# Patient Record
Sex: FEMALE | Race: WHITE
[De-identification: names, ages, dates, MRNs, and addresses within clinical notes are randomized per-mention and may not be internally consistent; named-entity substitution may affect disease eponyms.]

---

## 2018-07-12 ENCOUNTER — HOSPITAL ENCOUNTER (INPATIENT)
Dept: HOSPITAL 56 - MW.CHOBGYN | Age: 28
LOS: 3 days | Discharge: HOME | DRG: 540 | End: 2018-07-15
Attending: OBSTETRICS & GYNECOLOGY | Admitting: OBSTETRICS & GYNECOLOGY
Payer: COMMERCIAL

## 2018-07-12 DIAGNOSIS — Z30.2: ICD-10-CM

## 2018-07-12 DIAGNOSIS — O34.211: Primary | ICD-10-CM

## 2018-07-12 DIAGNOSIS — Z3A.38: ICD-10-CM

## 2018-07-12 DIAGNOSIS — Z91.040: ICD-10-CM

## 2018-07-13 PROCEDURE — 0UT70ZZ RESECTION OF BILATERAL FALLOPIAN TUBES, OPEN APPROACH: ICD-10-PCS | Performed by: OBSTETRICS & GYNECOLOGY

## 2018-07-13 RX ADMIN — KETOROLAC TROMETHAMINE SCH MG: 30 INJECTION, SOLUTION INTRAMUSCULAR at 23:10

## 2018-07-13 RX ADMIN — KETOROLAC TROMETHAMINE SCH MG: 30 INJECTION, SOLUTION INTRAMUSCULAR at 17:31

## 2018-07-13 RX ADMIN — KETOROLAC TROMETHAMINE SCH MG: 30 INJECTION, SOLUTION INTRAMUSCULAR at 11:18

## 2018-07-13 NOTE — PCM.SN
- Free Text/Narrative


Note: 





Asked to start patient IV.  IV started in right hand with 20g angiocath x 1 

attempt with good blood flow and flushes easily.  Secured with tegaderm.

## 2018-07-13 NOTE — OR
SURGEON:

Escobar Rayo MD

 

DATE OF PROCEDURE:

 

PREOPERATIVE DIAGNOSES:

1. Intrauterine pregnancy, term.

2. Multiple previous  sections, admitted for elective repeat 

    section.

3. Postpartum tubal ligation.

 

POSTOPERATIVE DIAGNOSES:

1. Intrauterine pregnancy, term.

2. Multiple previous  sections, admitted for elective repeat 

    section.

3. Postpartum tubal ligation.

 

OPERATION PERFORMED:

Repeat low-transverse  section with Tricia tubal ligation achieving by

doing distal salpingectomy.

 

ASSISTANT:

OR tech.

 

ANESTHESIA:

Spinal, eK Faulkner and Mikel Christina M.D.

 

ESTIMATED BLOOD LOSS:

800 mL.

 

COMPLICATIONS:

None.

 

INDICATION FOR THE SURGERY:

This patient is 28, she had 3 previous  sections.  She has a higher

risk.  She has a high BMI and she is admitted for elective repeat 

section.  She was approved by the ethic committee for postpartum tubal ligation.

 

PROCEDURE IN DETAIL:

The patient was brought to the OR, properly identified.  After adequate level of

spinal anesthesia, the patient was prepped and draped in sterile fashion as

usual.  Low-transverse Pfannenstiel skin incision was done.  Diana's fascia and

rectus fascia opened in direction of the incision.  The 2 recti muscles were

 and peritoneal cavity was entered.  The fetus was in the vertex

position.  Low-transverse uterine incision was done and extended manually with

the hand.  Fetus was delivered without any problem, cried immediately.  Apgar

score reported to be 8 and 9.  The weight is not available.  The placenta

delivered spontaneous, complete, and intact.  Repair of the lower uterine

segment was done in 2 layers using 2-0 Vicryl interlocking suture.  The

peritoneal cavity evacuated completely from all blood and blood clot.  Attention

was paid to the tube at this time and distal partial salpingectomy on both sides

was done to achieve bilateral tubal ligation.  Once that was done, inspection of

the operative field shows no oozing, no bleeding.  The peritoneum closed with 3-

0 Vicryl continuous and the rectus fascia was closed with #1 PDS double strand.

After that, the Avni-Kyle was placed in the subcutaneous fascia for drainage

because of the size of the patient and then 3-0 Vicryl used to close the

Diana's fascia and skin closed with skin clips.  Instrument and sponge count

was correct.  The patient tolerated the procedure well, went to recovery room in

stable general condition.

 

 

KATIE / BEATRICE

DD:  2018 10:17:26

DT:  2018 12:02:44

Job #:  [id number]

## 2018-07-13 NOTE — PCM.LDHP
L&D History of Present Illness





- General


Date of Service: 18


Admit Problem/Dx: 


 Patient Status Order with Admit Dx/Problem





18 01:35


Patient Status [ADT] Routine 





18 08:54


Patient Status [ADT] Routine 








 Admission Diagnosis/Problem











Admission Diagnosis/Problem    Pregnancy-related examination














Source of Information: Patient


History Limitations: Reports: No Limitations





- History of Present Illness


Improves with: Reports: None


Worsens with: Reports: None


Associated Symptoms: Reports: N





- Related Data


Allergies/Adverse Reactions: 


 Allergies











Allergy/AdvReac Type Severity Reaction Status Date / Time


 


latex Allergy  Rash Verified 07/10/18 10:36











Home Medications: 


 Home Meds





PNV95/Ferrous Fumarate/FA [Prenatal Tablet] 1 tab PO DAILY 07/10/18 [History]











Past Medical History





- Past Health History


Medical/Surgical History: Denies Medical/Surgical History


HEENT History: Reports: Other (See Below)


Other HEENT History: wears glasses lazy eye


OB/GYN History: Reports: Pregnancy


Other OB/BYN History:  Birth x2


Hematologic History: Reports: Anemia


Other Hematologic History: with first pregnancy.  Has taken iron with the last 

2 pregnancies.


Other Dermatologic History: right lower leg has sore that has clear fluid 

leaking and started x1.5 weeks ago. Sores notes on left lower leg





- Infectious Disease History


Infectious Disease History: Reports: Chicken Pox





- Past Surgical History


Head Surgeries/Procedures: Reports: Other (See Below)


HEENT Surgical History: Reports: None





Social & Family History





- Family History


OBGYN: Reports: Other (See Below)


Other OBGYN Family History: Mother and PGM Cancer





H&P Review of Systems





- Review of Systems:


Review Of Systems: See Below


General: Reports: No Symptoms


HEENT: Reports: No Symptoms


Pulmonary: Reports: No Symptoms


Cardiovascular: Reports: No Symptoms


Gastrointestinal: Reports: No Symptoms


Genitourinary: Reports: No Symptoms


Musculoskeletal: Reports: No Symptoms


Skin: Reports: No Symptoms


Psychiatric: Reports: No Symptoms


Neurological: Reports: No Symptoms


Hematologic/Lymphatic: Reports: No Symptoms


Immunologic: Reports: No Symptoms





L&D Exam





- Exam


Exam: See Below





- Vital Signs


Weight: 190.509 kg





- Exam


General: Alert, Oriented


HEENT: PERRLA, Conjunctiva Clear, EACs Clear, EOMI, Hearing Intact, Mucosa 

Moist & Pink, Nares Patent, Normal Nasal Septum, Posterior Pharynx Clear, TMs 

Clear


Neck: Supple, Trachea Midline


Lungs: Clear to Auscultation, Normal Respiratory Effort


Cardiovascular: Regular Rate, Regular Rhythm


GI/Abdominal Exam: Normal Bowel Sounds, Soft, Non-Tender, No Organomegaly, No 

Distention, No Abnormal Bruit, No Mass, Pelvis Stable


Rectal Exam: Normal Exam, Normal Rectal Tone


Genitourinary: Normal external exam, Normal bimanual exam, Normal speculum exam


Back Exam: Normal Inspection, Full Range of Motion


Extremities: Normal Inspection, Normal Range of Motion, Non-Tender, No Pedal 

Edema, Normal Capillary Refill


Skin: Warm, Dry, Intact


Neurological: Cranial Nerves Intact, Reflexes Equal Bilateral


Psychiatric: Alert, Normal Affect, Normal Mood





- Patient Data


Lab Results Last 24 hrs: 


 Laboratory Results - last 24 hr











  18 Range/Units





  00:13 01:50 01:50 


 


WBC   12.14 H   (4.0-11.0)  K/uL


 


RBC   3.42 L   (4.30-5.90)  M/uL


 


Hgb   9.7 L   (12.0-16.0)  g/dL


 


Hct   30.2 L   (36.0-46.0)  %


 


MCV   88.3   (80.0-98.0)  fL


 


MCH   28.4   (27.0-32.0)  pg


 


MCHC   32.1   (31.0-37.0)  g/dL


 


RDW Std Deviation   41.3   (28.0-62.0)  fl


 


RDW Coeff of Orquidea   14   (11.0-15.0)  %


 


Plt Count   242   (150-400)  K/uL


 


MPV   11.30   (7.40-12.00)  fL


 


Fetal Membrane Rupture  POSITIVE    


 


Blood Type    A POSITIVE  


 


Antibody Screen    NEGATIVE  











Result Diagrams: 


 18 01:50





Problem List Initiated/Reviewed/Updated: Yes


Orders Last 24hrs: 


 Active Orders 24 hr











 Category Date Time Status


 


 Patient Status [ADT] Routine ADT  18 08:54 Ordered


 


 Ambulate [RC] PER UNIT ROUTINE Care  18 08:54 Ordered


 


 Bradycardia-Neuroaxis [employer] [RC] ROUTINE Care  18 08:34 Active


 


 Communication Order [RC] PER UNIT ROUTINE Care  18 08:54 Ordered


 


 Communication Order [RC] PER UNIT ROUTINE Care  18 08:54 Ordered


 


 Communication Order [RC] Per Unit Routine Care  18 08:54 Ordered


 


 Fetal Non Stress Test [RC] PER UNIT ROUTINE Care  18 00:10 Active


 


 Fetal Non Stress Test [RC] PER UNIT ROUTINE Care  18 01:35 Active


 


 Hypertension-Neuroaxis [employer] [RC] ROUTINE Care  18 08:34 Active


 


 Hypotension-Neuroaxis [employer] [RC] ROUTINE Care  18 08:34 Active


 


 May Shower [RC] ASDIRECTED Care  18 08:54 Ordered


 


 Notify Provider Vital Signs [RC] PRN Care  18 01:37 Active


 


 Oxygen Therapy [RC] PER UNIT ROUTINE Care  18 08:34 Active


 


 Procedure Site Prep Instruct [RC] ASDIRECTED Care  18 01:35 Active


 


 RT Incentive Spirometry [RC] Q2HWA Care  18 08:54 Ordered


 


 Up ad Marley [RC] ASDIRECTED Care  18 00:10 Active


 


 Up ad Marley [RC] ASDIRECTED Care  18 01:35 Active


 


 Vaginal Exam [RC] Click to Edit Care  18 00:10 Active


 


 Verify Patient Consent Obtain [RC] ASDIRECTED Care  18 01:35 Active


 


 Vital Signs [RC] PER UNIT ROUTINE Care  18 00:10 Active


 


 Vital Signs [RC] PER UNIT ROUTINE Care  18 01:35 Active


 


 Vital Signs [RC] PER UNIT ROUTINE Care  18 08:54 Ordered


 


 Vital Signs [RC] Q1H Care  18 08:34 Active


 


 HEMOGLOBIN/HEMATOCRIT,HH [HEME] Timed Lab  18 05:11 Ordered


 


 Acetaminophen/oxyCODONE [Percocet 325-5 MG] Med  18 08:54 Ordered





 1 tab PO Q4H PRN   


 


 Acetaminophen/oxyCODONE [Percocet 325-5 MG] Med  18 08:54 Ordered





 2 tab PO Q4H PRN   


 


 Bisacodyl [Dulcolax] Med  18 08:54 Ordered





 10 mg RECTAL .ONCE PRN   


 


 Citric Acid/Sodium Citrate [Bicitra Solution] Med  18 01:45 Active





 30 ml PO .ONCE   


 


 Docusate Sodium [Colace] Med  18 09:00 Ordered





 100 mg PO BID   


 


 Ibuprofen [Motrin] Med  18 08:54 Ordered





 800 mg PO Q8H PRN   


 


 Ketorolac [Toradol] Med  18 09:00 Ordered





 30 mg IVPUSH Q6H   


 


 Lactated Ringers @ 125 MLS/HR(1000ml) Med  18 09:00 Ordered





 Lactated Ringers [Ringers, Lactated] 1,000 ml   





 IV ASDIRECTED   


 


 Lactated Ringers [Ringers, Lactated] 1,000 ml Med  18 01:45 Active





 IV .BOLUS   


 


 Lanolin [Lansinoh HPA] Med  18 08:54 Ordered





 See Dose Instructions  TOP ASDIRECTED PRN   


 


 Nalbuphine [Nubain] Med  18 08:34 Active





 10 mg IVPUSH Q3H PRN   


 


 Naloxone [Narcan] Med  18 08:34 Active





 0.1 mg IVPUSH ONETIME PRN   


 


 Ondansetron [Zofran] Med  18 08:54 Ordered





 4 mg IV Q4H PRN   


 


 Oxytocin/0.9 % Sodium Chloride [Oxytocin 30 Unit/500 ML Med  18 01:45 

Active





 -NS]   





 30 unit in 500 ml IV TITRATE   


 


 Sodium Chloride 0.9% [Saline Flush] Med  18 01:35 Active





 10 ml FLUSH ASDIRECTED PRN   


 


 Sodium Chloride 0.9% [Saline Flush] Med  18 01:35 Active





 2.5 ml FLUSH ASDIRECTED PRN   


 


 diphenhydrAMINE [Benadryl] Med  18 08:54 Ordered





 25 mg IVPUSH Q6H PRN   


 


 AN Neuroaxis [employer] Precaution Reflex [OM.PC] PER Ot  18 08:45 

Ordered





 UNIT ROUTINE   


 


 AN Neuroaxis [employer] Precaution Reflex [OM.PC] PER Ot  18 08:45 

Ordered





 UNIT ROUTINE   


 


 Assess Lochia [WOMSER] Per Unit Routine Ot  18 08:54 Ordered


 


 Assess Uterine Involution [WOMSER] Per Unit Routine Ot  18 08:54 Ordered


 


 Breast Pump [WOMSER] Per Unit Routine Ot  18 08:54 Ordered


 


 Peripheral IV Discontinue [OM.PC] Routine Ot  18 08:54 Ordered


 


 Peripheral IV Insertion Adult [OM.PC] Routine Ot  18 01:35 Ordered


 


 Schedule Procedure [COMM] Per Unit Routine Ot  18 01:35 Ordered


 


 Sequential Compression Device [OM.PC] Per Unit Routine Ot  18 08:54 

Ordered


 


 Resuscitation Status Routine Resus Stat  18 00:10 Ordered








 Medication Orders





Citric Acid/Sodium Citrate (Bicitra Solution)  30 ml PO .ONCE ALYSON


   Last Admin: 18 07:38  Dose: 30 ml


Oxytocin/Sodium Chloride (Oxytocin 30 Unit/500 Ml-Ns)  30 unit in 500 mls @ 250 

mls/hr IV TITRATE ALYSON


Lactated Ringer's (Ringers, Lactated)  1,000 mls @ 500 mls/hr IV .BOLUS ALYSON


   Last Admin: 18 07:26  Dose: 500 mls/hr


   Infusion: 18 03:45  Dose: 500 mls/hr


   Admin: 18 01:45  Dose: 500 mls/hr


Nalbuphine HCl (Nubain)  10 mg IVPUSH Q3H PRN


   PRN Reason: Pruritis


   Stop: 18 08:35


Naloxone HCl (Narcan)  0.1 mg IVPUSH ONETIME PRN


   PRN Reason: Respiratory Depression


   Stop: 18 08:34


Sodium Chloride (Saline Flush)  10 ml FLUSH ASDIRECTED PRN


   PRN Reason: Keep Vein Open


Sodium Chloride (Saline Flush)  2.5 ml FLUSH ASDIRECTED PRN


   PRN Reason: Keep Vein Open








Assessment/Plan Comment:: 





Admitted for repeat C/section and tubal ligation.

## 2018-07-13 NOTE — PCM.PREANE
Preanesthetic Assessment





- Procedure


Proposed Procedure: 





; prior 2 yr ago; BTL





- Anesthesia/Transfusion/Family Hx


Anesthesia History: Prior Anesthesia Without Reaction


Family History of Anesthesia Reaction: No


Additional History: 





BMI 74








- Review of Systems


General: Other (active labor)


Pulmonary: No Symptoms


Cardiovascular: No Symptoms


Gastrointestinal: Other (GERD)


Neurological: Difficulty Walking (due to weight)


Other: Reports: None





- Physical Assessment


NPO Status Date: 18


NPO Status Time: 22:30


Height: 5 ft 3 in


Weight: 420 lb


ASA Class: 3E


Mental Status: Alert & Oriented x3


Airway Class: Mallampati = 2


Dentition: Reports: Normal Dentition


Thyro-Mental Finger Breadths: 3 (short ,full neck)


Mouth Opening Finger Breadths: 3


ROM/Head Extension: Limited/Partial


Lungs: Clear to Auscultation, Normal Respiratory Effort


Cardiovascular: Regular Rate, Regular Rhythm, No Murmurs





- Lab


Values: 





 Laboratory Last Values











WBC  12.14 K/uL (4.0-11.0)  H  18  01:50    


 


RBC  3.42 M/uL (4.30-5.90)  L  18  01:50    


 


Hgb  9.7 g/dL (12.0-16.0)  L  18  01:50    


 


Hct  30.2 % (36.0-46.0)  L  18  01:50    


 


MCV  88.3 fL (80.0-98.0)   18  01:50    


 


MCH  28.4 pg (27.0-32.0)   18  01:50    


 


MCHC  32.1 g/dL (31.0-37.0)   18  01:50    


 


RDW Std Deviation  41.3 fl (28.0-62.0)   18  01:50    


 


RDW Coeff of Orquidea  14 % (11.0-15.0)   18  01:50    


 


Plt Count  242 K/uL (150-400)   18  01:50    


 


MPV  11.30 fL (7.40-12.00)   18  01:50    


 


Fetal Membrane Rupture  POSITIVE   18  00:13    


 


Blood Type  A POSITIVE   18  01:50    


 


Antibody Screen  NEGATIVE   18  01:50    














- Allergies


Allergies/Adverse Reactions: 


 Allergies











Allergy/AdvReac Type Severity Reaction Status Date / Time


 


latex Allergy  Rash Verified 07/10/18 10:36














- Blood


Blood Available: No


Product(s) Available: PRBC (T and S)





- Anesthesia Plan


Pre-Op Medication Ordered: Antacids





- Acknowledgements


Anesthesia Type Planned: Spinal (sitting; if unsuccessful, then general 

anesthetic (she understands))


Pt an Appropriate Candidate for the Planned Anesthesia: Yes


Alternatives and Risks of Anesthesia Discussed w Pt/Guardian: Yes


Pt/Guardian Understands and Agrees with Anesthesia Plan: Yes





PreAnesthesia Questionnaire





- Past Health History


Medical/Surgical History: Denies Medical/Surgical History


HEENT History: Reports: Other (See Below)


Other HEENT History: wears glasses lazy eye


OB/GYN History: Reports: Pregnancy


Other OB/BYN History:  Birth x2


Hematologic History: Reports: Anemia


Other Hematologic History: with first pregnancy.  Has taken iron with the last 

2 pregnancies.


Other Dermatologic History: right lower leg has sore that has clear fluid 

leaking and started x1.5 weeks ago. Sores notes on left lower leg





- Infectious Disease History


Infectious Disease History: Reports: Chicken Pox





- Past Surgical History


Head Surgeries/Procedures: Reports: Other (See Below)


HEENT Surgical History: Reports: None





- HOME MEDS


Home Medications: 


 Home Meds





PNV95/Ferrous Fumarate/FA [Prenatal Tablet] 1 tab PO DAILY 07/10/18 [History]











- CURRENT (IN HOUSE) MEDS


Current Meds: 





 Current Medications





Citric Acid/Sodium Citrate (Bicitra Solution)  30 ml PO .ONCE ALYSON


Oxytocin/Sodium Chloride (Oxytocin 30 Unit/500 Ml-Ns)  30 unit in 500 mls @ 250 

mls/hr IV TITRATE ALYSON


Lactated Ringer's (Ringers, Lactated)  1,000 mls @ 500 mls/hr IV .BOLUS ALYSON


   Last Admin: 18 01:45 Dose:  500 mls/hr


Sodium Chloride (Saline Flush)  10 ml FLUSH ASDIRECTED PRN


   PRN Reason: Keep Vein Open


Sodium Chloride (Saline Flush)  2.5 ml FLUSH ASDIRECTED PRN


   PRN Reason: Keep Vein Open





Discontinued Medications





Fentanyl (Sublimaze) Confirm Administered Dose 250 mcg .ROUTE .STK-MED ONE


   Stop: 18 07:12


Fentanyl (Sublimaze) Confirm Administered Dose 100 mcg .ROUTE .STK-MED ONE


   Stop: 18 07:13


Lidocaine HCl (Xylocaine-Mpf 1%) Confirm Administered Dose 5 mls @ as directed 

.ROUTE .ST-MED ONE


   Stop: 18 01:52


Lidocaine (Xylocaine-Mpf 2%) Confirm Administered Dose 10 ml .ROUTE .STK-MED ONE


   Stop: 18 07:12


Midazolam HCl (Versed 1 Mg/Ml) Confirm Administered Dose 2 mg .ROUTE .STK-MED 

ONE


   Stop: 18 07:12


Ondansetron HCl (Zofran) Confirm Administered Dose 8 mg .ROUTE .STK-MED ONE


   Stop: 18 07:13


Oxytocin (Pitocin) Confirm Administered Dose 20 unit .ROUTE .STK-MED ONE


   Stop: 18 07:13


Propofol (Diprivan  20 Ml) Confirm Administered Dose 400 mg .ROUTE .STK-MED ONE


   Stop: 18 07:12


Succinylcholine Chloride (Quelicin) Confirm Administered Dose 200 mg .ROUTE .STK

-MED ONE


   Stop: 18 07:13

## 2018-07-13 NOTE — PCM.OPNOTE
- General Post-Op/Procedure Note


Date of Surgery/Procedure: 07/13/18


Operative Procedure(s): Repeat C/section. Tubal ligation.


Post-Op Diagnosis: Same


Anesthesia Technique: Spinal


Primary Surgeon: Escobar Rayo


EBL in mLs: 800


Complications: None


Condition: Good

## 2018-07-14 RX ADMIN — KETOROLAC TROMETHAMINE SCH MG: 30 INJECTION, SOLUTION INTRAMUSCULAR at 04:58

## 2018-07-14 RX ADMIN — OXYCODONE HYDROCHLORIDE AND ACETAMINOPHEN PRN TAB: 5; 325 TABLET ORAL at 11:18

## 2018-07-14 RX ADMIN — KETOROLAC TROMETHAMINE SCH: 30 INJECTION, SOLUTION INTRAMUSCULAR at 11:19

## 2018-07-14 NOTE — [id number]
Post Anesthesia Note





- EVALUATION WITHIN 48HRS OF ANESTHETIC


Vital Signs in Normal Range: Yes


Patient Participated in Evaluation: Yes


Respiratory Function Stable: Yes


Airway Patent: Yes


Cardiovascular Function Stable: Yes


Hydration Status Stable: Yes


Pain Control Satisfactory: Yes


Nausea and Vomiting Control Satisfactory: Yes


Mental Status Recovered: Yes


Resp Rate: 18





- COMMENTS/OBSERVATIONS


Free Text/Narrative:: 


Duramorph vitals from day shift are documented in the paper chart

## 2018-07-15 RX ADMIN — OXYCODONE HYDROCHLORIDE AND ACETAMINOPHEN PRN TAB: 5; 325 TABLET ORAL at 08:51

## 2018-07-15 NOTE — PCM.PNPP
- General Info


Date of Service: 07/15/18


Functional Status: Reports: Pain Controlled, Tolerating Diet, Ambulating, 

Urinating





- Review of Systems


General: Denies: Fever, Weakness, Chills


HEENT: Denies: Headaches


Pulmonary: Denies: Shortness of Breath, Pleuritic Chest Pain


Cardiovascular: Denies: Chest Pain, Palpitations


Gastrointestinal: Denies: Abdominal Pain


Genitourinary: Denies: Burning, Incontinence, Flank Pain


Psychiatric: Denies: Confusion, Depression, Mood Lability, Anxiety





- General Info


Date of Service: 07/15/18





- Patient Data


Vital Signs - Most Recent: 


 Last Vital Signs











Temp  36.4 C   07/15/18 07:46


 


Pulse  94   07/15/18 07:46


 


Resp  20   07/15/18 07:46


 


BP  138/89   07/15/18 07:46


 


Pulse Ox  97   07/15/18 08:10











Weight - Most Recent: 420 lb


I&O - Last 24 Hours: 


 Intake & Output











 07/14/18 07/15/18 07/15/18





 22:59 06:59 14:59


 


Output Total 900  


 


Balance -900  











Med Orders - Current: 


 Current Medications





Bisacodyl (Dulcolax)  10 mg RECTAL .ONCE PRN


   PRN Reason: Constipation


Citric Acid/Sodium Citrate (Bicitra Solution)  30 ml PO .ONCE [facility]


   Last Admin: 18 07:38 Dose:  30 ml


Diphenhydramine HCl (Benadryl)  25 mg IVPUSH Q6H PRN


   PRN Reason: Itching or Nausea


Docusate Sodium (Colace)  100 mg PO BID [facility]


   Last Admin: 07/15/18 08:49 Dose:  100 mg


Emollient Ointment (Lansinoh Hpa)  0 gm TOP ASDIRECTED PRN


   PRN Reason: Sore Nipples


Enoxaparin Sodium (Lovenox)  40 mg SUBCUT Q24H [facility]


   Last Admin: 18 19:36 Dose:  40 mg


Oxytocin/Sodium Chloride (Oxytocin 30 Unit/500 Ml-Ns)  30 unit in 500 mls @ 250 

mls/hr IV TITRATE [facility]


Lactated Ringer's (Ringers, Lactated)  1,000 mls @ 500 mls/hr IV .BOLUS [facility]


   Last Admin: 18 07:26 Dose:  500 mls/hr


Lactated Ringer's (Ringers, Lactated)  1,000 mls @ 125 mls/hr IV ASDIRECTED ALYSON


Ibuprofen (Motrin)  800 mg PO Q8H PRN


   PRN Reason: mild pain or fever


   Last Admin: 07/15/18 04:00 Dose:  800 mg


Ondansetron HCl (Zofran)  4 mg IV Q4H PRN


   PRN Reason: Nausea/Vomiting


Oxycodone/Acetaminophen (Percocet 325-5 Mg)  1 tab PO Q4H PRN


   PRN Reason: Pain (moderate 4-6)


   Last Admin: 07/15/18 08:51 Dose:  1 tab


Oxycodone/Acetaminophen (Percocet 325-5 Mg)  2 tab PO Q4H PRN


   PRN Reason: Pain (moderate 4-6)


   Last Admin: 18 22:27 Dose:  2 tab


Sodium Chloride (Saline Flush)  10 ml FLUSH ASDIRECTED PRN


   PRN Reason: Keep Vein Open


Sodium Chloride (Saline Flush)  2.5 ml FLUSH ASDIRECTED PRN


   PRN Reason: Keep Vein Open





Discontinued Medications





Ephedrine Sulfate (Ephedrine Sulfate) Confirm Administered Dose 100 mg .ROUTE 

.STK-MED ONE


   Stop: 18 07:29


Fentanyl (Sublimaze) Confirm Administered Dose 250 mcg .ROUTE .STK-MED ONE


   Stop: 18 07:12


Fentanyl (Sublimaze) Confirm Administered Dose 100 mcg .ROUTE .STK-MED ONE


   Stop: 18 07:13


Lidocaine HCl (Xylocaine-Mpf 1%) Confirm Administered Dose 5 mls @ as directed 

.ROUTE .STK-MED ONE


   Stop: 18 01:52


Ketorolac Tromethamine (Toradol)  30 mg IVPUSH Q6H [facility]


   Stop: 18 09:01


   Last Admin: 18 11:19 Dose:  Not Given


Lidocaine (Xylocaine-Mpf 2%) Confirm Administered Dose 10 ml .ROUTE .STK-MED ONE


   Stop: 18 07:12


Midazolam HCl (Versed 1 Mg/Ml) Confirm Administered Dose 2 mg .ROUTE .STK-MED 

ONE


   Stop: 18 07:12


Morphine Sulfate (Duramorph Pf) Confirm Administered Dose 1 mg .ROUTE .STK-MED 

ONE


   Stop: 18 07:32


Nalbuphine HCl (Nubain)  10 mg IVPUSH Q3H PRN


   PRN Reason: Pruritis


   Stop: 18 08:35


Naloxone HCl (Narcan)  0.1 mg IVPUSH ONETIME PRN


   PRN Reason: Respiratory Depression


   Stop: 18 08:34


Octyl Cyanoacrylate (Dermabond Advance) Confirm Administered Dose 1 applic 

.ROUTE .STK-MED ONE


   Stop: 18 07:20


Ondansetron HCl (Zofran) Confirm Administered Dose 8 mg .ROUTE .STK-MED ONE


   Stop: 18 07:13


Oxytocin (Pitocin) Confirm Administered Dose 20 unit .ROUTE .STK-MED ONE


   Stop: 18 07:13


Propofol (Diprivan  20 Ml) Confirm Administered Dose 400 mg .ROUTE .STK-MED ONE


   Stop: 18 07:12


Succinylcholine Chloride (Quelicin) Confirm Administered Dose 200 mg .ROUTE .STK

-MED ONE


   Stop: 18 07:13











- Infant Interaction


Infant Disposition, Postpartum:  in Room with Family


Infant Feeding:  Infant; Nursed Well, Continues to Breastfeed


Support Person: 





- Postpartum Recovery Exam


Fundal Tone: Firm


Fundal Level: 1 Fingerbreadths Below Umbilicus


Fundal Placement: Midline


Lochia Amount: Scant


Lochia Color: Rubra/Red


Perineum Description: Intact, Minimal Bruising/Swelling


Episiotomy/Laceration: Approximated


Bladder Status: Voiding


Urinary Elimination: Voided





- Exam


General: Alert, Oriented


Lungs: Clear to Auscultation, Normal Respiratory Effort


Cardiovascular: Regular Rate, Regular Rhythm


GI/Abdominal Exam: Non-Tender


Extremities: Non-Tender, Pedal Edema


Skin: Warm


Wound/Incisions: Healing Well, No Drainage (MOLLY site dry)


Psy/Mental Status: Alert, Normal Affect, Normal Mood





- Problem List & Annotations


(1) Delivered by  delivery following previous  delivery


SNOMED Code(s): 864446299


   Code(s): WEH5680 -    Status: Acute   Current Visit: Yes   





(2) Morbid obesity with BMI of 70 and over, adult


SNOMED Code(s): 180012601


   Code(s): E66.01 - MORBID (SEVERE) OBESITY DUE TO EXCESS CALORIES; Z68.45 - 

BODY MASS INDEX (BMI) 70 OR GREATER, ADULT   Status: Acute   Current Visit: Yes

   





- Problem List Review


Problem List Initiated/Reviewed/Updated: Yes





- Assessment


Assessment:: 


POD#2 s/p RLTCS with bilateral tubal ligation, Stable and afebrile


Clinically stable for discharge





- Plan


Plan:: 


Discharge instructions


Nothing in the vagina for 6 weeks


Care of her incision- keeeping it clean and dry was reviewed.


S/S of VTE were reviewed extensively with patient


Postpartum blues vs depression S/S were reviewed


Prescription for pain meds written- Percocet and Ibuprofen


Follow up in 2 weeks at [facility]

## 2023-01-02 ENCOUNTER — HOSPITAL ENCOUNTER (EMERGENCY)
Dept: HOSPITAL 56 - MW.ED | Age: 33
Discharge: HOME | End: 2023-01-02
Payer: SELF-PAY

## 2023-01-02 DIAGNOSIS — Z91.040: ICD-10-CM

## 2023-01-02 DIAGNOSIS — M25.561: Primary | ICD-10-CM

## 2023-01-02 PROCEDURE — 99283 EMERGENCY DEPT VISIT LOW MDM: CPT

## 2023-01-02 PROCEDURE — 96372 THER/PROPH/DIAG INJ SC/IM: CPT

## 2023-01-02 PROCEDURE — 73562 X-RAY EXAM OF KNEE 3: CPT

## 2025-01-06 ENCOUNTER — HOSPITAL ENCOUNTER (EMERGENCY)
Dept: HOSPITAL 56 - MW.ED | Age: 35
Discharge: HOME | End: 2025-01-06
Payer: COMMERCIAL

## 2025-01-06 DIAGNOSIS — Z91.040: ICD-10-CM

## 2025-01-06 DIAGNOSIS — Z79.899: ICD-10-CM

## 2025-01-06 DIAGNOSIS — Z90.710: ICD-10-CM

## 2025-01-06 DIAGNOSIS — Z75.8: ICD-10-CM
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
## 2025-01-06 DIAGNOSIS — S89.92XA: Primary | ICD-10-CM

## 2025-02-18 ENCOUNTER — HOSPITAL ENCOUNTER (EMERGENCY)
Dept: HOSPITAL 56 - MW.ED | Age: 35
Discharge: HOME | End: 2025-02-18
Payer: COMMERCIAL

## 2025-02-18 DIAGNOSIS — Z91.040: ICD-10-CM

## 2025-02-18 DIAGNOSIS — S89.92XA: Primary | ICD-10-CM
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
## 2025-02-18 PROCEDURE — 73562 X-RAY EXAM OF KNEE 3: CPT

## 2025-02-18 PROCEDURE — 99283 EMERGENCY DEPT VISIT LOW MDM: CPT

## 2025-02-18 PROCEDURE — 96372 THER/PROPH/DIAG INJ SC/IM: CPT

## 2025-02-18 RX ADMIN — KETOROLAC TROMETHAMINE ONE MG: 30 INJECTION, SOLUTION INTRAMUSCULAR at 11:02
